# Patient Record
Sex: FEMALE | Race: WHITE | NOT HISPANIC OR LATINO | Employment: FULL TIME | URBAN - METROPOLITAN AREA
[De-identification: names, ages, dates, MRNs, and addresses within clinical notes are randomized per-mention and may not be internally consistent; named-entity substitution may affect disease eponyms.]

---

## 2023-03-17 RX ORDER — LEVOTHYROXINE SODIUM 25 UG/1
1 TABLET ORAL DAILY
COMMUNITY
Start: 2022-03-21 | End: 2023-05-04 | Stop reason: SDUPTHER

## 2023-04-27 ENCOUNTER — LAB (OUTPATIENT)
Dept: LAB | Facility: LAB | Age: 63
End: 2023-04-27
Payer: COMMERCIAL

## 2023-04-27 DIAGNOSIS — E78.5 HYPERLIPIDEMIA, UNSPECIFIED HYPERLIPIDEMIA TYPE: ICD-10-CM

## 2023-04-27 DIAGNOSIS — E03.9 HYPOTHYROIDISM, UNSPECIFIED TYPE: ICD-10-CM

## 2023-04-27 DIAGNOSIS — Z00.00 HEALTHCARE MAINTENANCE: ICD-10-CM

## 2023-04-27 LAB
ALANINE AMINOTRANSFERASE (SGPT) (U/L) IN SER/PLAS: 22 U/L (ref 7–45)
ALBUMIN (G/DL) IN SER/PLAS: 4.5 G/DL (ref 3.4–5)
ALKALINE PHOSPHATASE (U/L) IN SER/PLAS: 64 U/L (ref 33–136)
ANION GAP IN SER/PLAS: 15 MMOL/L (ref 10–20)
ASPARTATE AMINOTRANSFERASE (SGOT) (U/L) IN SER/PLAS: 25 U/L (ref 9–39)
BASOPHILS (10*3/UL) IN BLOOD BY AUTOMATED COUNT: 0.05 X10E9/L (ref 0–0.1)
BASOPHILS/100 LEUKOCYTES IN BLOOD BY AUTOMATED COUNT: 0.8 % (ref 0–2)
BILIRUBIN TOTAL (MG/DL) IN SER/PLAS: 0.7 MG/DL (ref 0–1.2)
CALCIUM (MG/DL) IN SER/PLAS: 9.5 MG/DL (ref 8.6–10.3)
CARBON DIOXIDE, TOTAL (MMOL/L) IN SER/PLAS: 27 MMOL/L (ref 21–32)
CHLORIDE (MMOL/L) IN SER/PLAS: 104 MMOL/L (ref 98–107)
CHOLESTEROL (MG/DL) IN SER/PLAS: 212 MG/DL (ref 0–199)
CHOLESTEROL IN HDL (MG/DL) IN SER/PLAS: 73.7 MG/DL
CHOLESTEROL/HDL RATIO: 2.9
CREATININE (MG/DL) IN SER/PLAS: 1.04 MG/DL (ref 0.5–1.05)
EOSINOPHILS (10*3/UL) IN BLOOD BY AUTOMATED COUNT: 0.14 X10E9/L (ref 0–0.7)
EOSINOPHILS/100 LEUKOCYTES IN BLOOD BY AUTOMATED COUNT: 2.2 % (ref 0–6)
ERYTHROCYTE DISTRIBUTION WIDTH (RATIO) BY AUTOMATED COUNT: 13 % (ref 11.5–14.5)
ERYTHROCYTE MEAN CORPUSCULAR HEMOGLOBIN CONCENTRATION (G/DL) BY AUTOMATED: 31.9 G/DL (ref 32–36)
ERYTHROCYTE MEAN CORPUSCULAR VOLUME (FL) BY AUTOMATED COUNT: 95 FL (ref 80–100)
ERYTHROCYTES (10*6/UL) IN BLOOD BY AUTOMATED COUNT: 4.45 X10E12/L (ref 4–5.2)
GFR FEMALE: 60 ML/MIN/1.73M2
GLUCOSE (MG/DL) IN SER/PLAS: 83 MG/DL (ref 74–99)
HEMATOCRIT (%) IN BLOOD BY AUTOMATED COUNT: 42.3 % (ref 36–46)
HEMOGLOBIN (G/DL) IN BLOOD: 13.5 G/DL (ref 12–16)
IMMATURE GRANULOCYTES/100 LEUKOCYTES IN BLOOD BY AUTOMATED COUNT: 0.2 % (ref 0–0.9)
LDL: 122 MG/DL (ref 0–99)
LEUKOCYTES (10*3/UL) IN BLOOD BY AUTOMATED COUNT: 6.4 X10E9/L (ref 4.4–11.3)
LYMPHOCYTES (10*3/UL) IN BLOOD BY AUTOMATED COUNT: 2.41 X10E9/L (ref 1.2–4.8)
LYMPHOCYTES/100 LEUKOCYTES IN BLOOD BY AUTOMATED COUNT: 37.8 % (ref 13–44)
MONOCYTES (10*3/UL) IN BLOOD BY AUTOMATED COUNT: 0.6 X10E9/L (ref 0.1–1)
MONOCYTES/100 LEUKOCYTES IN BLOOD BY AUTOMATED COUNT: 9.4 % (ref 2–10)
NEUTROPHILS (10*3/UL) IN BLOOD BY AUTOMATED COUNT: 3.17 X10E9/L (ref 1.2–7.7)
NEUTROPHILS/100 LEUKOCYTES IN BLOOD BY AUTOMATED COUNT: 49.6 % (ref 40–80)
PLATELETS (10*3/UL) IN BLOOD AUTOMATED COUNT: 249 X10E9/L (ref 150–450)
POTASSIUM (MMOL/L) IN SER/PLAS: 4.8 MMOL/L (ref 3.5–5.3)
PROTEIN TOTAL: 7 G/DL (ref 6.4–8.2)
SODIUM (MMOL/L) IN SER/PLAS: 141 MMOL/L (ref 136–145)
THYROTROPIN (MIU/L) IN SER/PLAS BY DETECTION LIMIT <= 0.05 MIU/L: 3.59 MIU/L (ref 0.44–3.98)
TRIGLYCERIDE (MG/DL) IN SER/PLAS: 81 MG/DL (ref 0–149)
UREA NITROGEN (MG/DL) IN SER/PLAS: 24 MG/DL (ref 6–23)
VLDL: 16 MG/DL (ref 0–40)

## 2023-04-27 PROCEDURE — 80053 COMPREHEN METABOLIC PANEL: CPT

## 2023-04-27 PROCEDURE — 80061 LIPID PANEL: CPT

## 2023-04-27 PROCEDURE — 36415 COLL VENOUS BLD VENIPUNCTURE: CPT

## 2023-04-27 PROCEDURE — 82306 VITAMIN D 25 HYDROXY: CPT

## 2023-04-27 PROCEDURE — 84443 ASSAY THYROID STIM HORMONE: CPT

## 2023-04-27 PROCEDURE — 85025 COMPLETE CBC W/AUTO DIFF WBC: CPT

## 2023-04-28 LAB — CALCIDIOL (25 OH VITAMIN D3) (NG/ML) IN SER/PLAS: 45 NG/ML

## 2023-05-01 ASSESSMENT — PROMIS GLOBAL HEALTH SCALE
EMOTIONAL_PROBLEMS: RARELY
RATE_MENTAL_HEALTH: GOOD
RATE_AVERAGE_PAIN: 2
RATE_PHYSICAL_HEALTH: GOOD
RATE_AVERAGE_FATIGUE: MILD
RATE_GENERAL_HEALTH: GOOD
CARRYOUT_PHYSICAL_ACTIVITIES: COMPLETELY
RATE_SOCIAL_SATISFACTION: GOOD
CARRYOUT_SOCIAL_ACTIVITIES: GOOD
RATE_QUALITY_OF_LIFE: GOOD

## 2023-05-04 ENCOUNTER — OFFICE VISIT (OUTPATIENT)
Dept: PRIMARY CARE | Facility: CLINIC | Age: 63
End: 2023-05-04
Payer: COMMERCIAL

## 2023-05-04 VITALS
HEART RATE: 72 BPM | SYSTOLIC BLOOD PRESSURE: 107 MMHG | DIASTOLIC BLOOD PRESSURE: 73 MMHG | BODY MASS INDEX: 23 KG/M2 | HEIGHT: 63 IN | WEIGHT: 129.8 LBS | OXYGEN SATURATION: 98 %

## 2023-05-04 DIAGNOSIS — E03.9 HYPOTHYROIDISM, UNSPECIFIED TYPE: ICD-10-CM

## 2023-05-04 DIAGNOSIS — Z12.31 ENCOUNTER FOR SCREENING MAMMOGRAM FOR MALIGNANT NEOPLASM OF BREAST: Primary | ICD-10-CM

## 2023-05-04 DIAGNOSIS — N89.8 VAGINAL DRYNESS: ICD-10-CM

## 2023-05-04 PROCEDURE — 1036F TOBACCO NON-USER: CPT | Performed by: NURSE PRACTITIONER

## 2023-05-04 PROCEDURE — 99396 PREV VISIT EST AGE 40-64: CPT | Performed by: NURSE PRACTITIONER

## 2023-05-04 RX ORDER — LEVOTHYROXINE SODIUM 25 UG/1
25 TABLET ORAL DAILY
Qty: 90 TABLET | Refills: 1 | Status: SHIPPED | OUTPATIENT
Start: 2023-05-04 | End: 2023-12-22

## 2023-05-04 RX ORDER — ESTRADIOL 0.1 MG/G
2 CREAM VAGINAL DAILY
Qty: 42.5 G | Refills: 5 | Status: SHIPPED | OUTPATIENT
Start: 2023-05-04 | End: 2024-05-03

## 2023-05-04 ASSESSMENT — ENCOUNTER SYMPTOMS
DIARRHEA: 0
FATIGUE: 0
NAUSEA: 0
PALPITATIONS: 0
SORE THROAT: 0
VOMITING: 0
MUSCULOSKELETAL NEGATIVE: 1
FEVER: 0
NUMBNESS: 0
PSYCHIATRIC NEGATIVE: 1
ABDOMINAL PAIN: 0
WEAKNESS: 0
SHORTNESS OF BREATH: 0
CONSTIPATION: 0
DIZZINESS: 0
CHILLS: 0
COUGH: 0
HEADACHES: 0

## 2023-05-04 ASSESSMENT — PATIENT HEALTH QUESTIONNAIRE - PHQ9
SUM OF ALL RESPONSES TO PHQ9 QUESTIONS 1 AND 2: 0
1. LITTLE INTEREST OR PLEASURE IN DOING THINGS: NOT AT ALL
2. FEELING DOWN, DEPRESSED OR HOPELESS: NOT AT ALL

## 2023-05-04 NOTE — PROGRESS NOTES
"Subjective   Patient ID: Olga Lane is a 62 y.o. female who presents for annual follow-up and review of lab results      HPI   Patient is feeling well  Reviewed recent lab results, questions about abnormal cholesterol.  Discussed dietary changes.  Remains physically active  Denies chest pain, SOB, palpitations, dizziness,  or GI issues.  Reports vaginal dryness with intercourse.  Would like a renewal of the estrogen cream.  Vital signs stable    Review of Systems   Constitutional:  Negative for chills, fatigue and fever.   HENT:  Negative for congestion, ear pain and sore throat.    Eyes:  Negative for visual disturbance.   Respiratory:  Negative for cough and shortness of breath.    Cardiovascular:  Negative for chest pain, palpitations and leg swelling.   Gastrointestinal:  Negative for abdominal pain, constipation, diarrhea, nausea and vomiting.   Genitourinary: Negative.    Musculoskeletal: Negative.    Skin:  Negative for rash.   Neurological:  Negative for dizziness, weakness, numbness and headaches.   Psychiatric/Behavioral: Negative.         Objective   /73   Pulse 72   Ht 1.588 m (5' 2.5\")   Wt 58.9 kg (129 lb 12.8 oz)   SpO2 98%   BMI 23.36 kg/m²     Physical Exam  Constitutional:       General: She is not in acute distress.     Appearance: Normal appearance.   HENT:      Head: Normocephalic and atraumatic.      Right Ear: Tympanic membrane, ear canal and external ear normal.      Left Ear: Tympanic membrane, ear canal and external ear normal.      Nose: Nose normal.      Mouth/Throat:      Mouth: Mucous membranes are moist.      Pharynx: Oropharynx is clear.   Eyes:      Extraocular Movements: Extraocular movements intact.      Conjunctiva/sclera: Conjunctivae normal.      Pupils: Pupils are equal, round, and reactive to light.   Cardiovascular:      Rate and Rhythm: Normal rate and regular rhythm.      Pulses: Normal pulses.      Heart sounds: Normal heart sounds. No murmur heard.  Pulmonary: "      Effort: Pulmonary effort is normal.      Breath sounds: Normal breath sounds. No wheezing, rhonchi or rales.   Abdominal:      General: Bowel sounds are normal.      Palpations: Abdomen is soft.      Tenderness: There is no abdominal tenderness.   Musculoskeletal:         General: Normal range of motion.      Cervical back: Normal range of motion and neck supple.   Lymphadenopathy:      Comments: No lymphadenopathy noted   Skin:     General: Skin is warm and dry.      Findings: No rash.   Neurological:      General: No focal deficit present.      Mental Status: She is alert and oriented to person, place, and time.      Cranial Nerves: No cranial nerve deficit.      Coordination: Coordination normal.      Gait: Gait normal.   Psychiatric:         Mood and Affect: Mood normal.         Behavior: Behavior normal.         Assessment/Plan   Problem List Items Addressed This Visit          Genitourinary    Vaginal dryness     Start estrogen cream as directed         Relevant Medications    estradiol (Estrace) 0.01 % (0.1 mg/gram) vaginal cream       Endocrine/Metabolic    Hypothyroidism     Continue levothyroxine at 25 mcg daily  We will recheck TSH at next visit         Relevant Medications    levothyroxine (Synthroid, Levoxyl) 25 mcg tablet       Other    Encounter for screening mammogram for malignant neoplasm of breast - Primary    Relevant Orders    BI mammo bilateral screening tomosynthesis     Colonoscopy done 5 years ago, due again 2028.

## 2023-05-04 NOTE — PATIENT INSTRUCTIONS
Start calcium supplement with vitamin D capsule daily  Consider seeing gynecology to discuss further Pap screening  Schedule mammogram  Continue levothyroxine at prescribed dose  Use estrogen cream as directed

## 2023-05-22 ENCOUNTER — TELEPHONE (OUTPATIENT)
Dept: PRIMARY CARE | Facility: CLINIC | Age: 63
End: 2023-05-22
Payer: COMMERCIAL

## 2023-05-22 NOTE — TELEPHONE ENCOUNTER
Result Communication    No results found from the In Basket message.    11:21 AM      Results {WERE / WERE NOT:68483} successfully communicated with the {RHEUM PARENT/PATIENT:[id number]} and they {AMB Acknowledged/Did Not Acknowledge:84001} their understanding.

## 2023-05-22 NOTE — TELEPHONE ENCOUNTER
----- Message from Jerald Marcial MA sent at 5/22/2023 11:10 AM EDT -----  Would like you to call her and let her know how to use this vaginal cream you prescribed her, she is confused because of the syringe the came with it.  And I am not sure what she is talking about because I have never used anything like that

## 2023-12-22 DIAGNOSIS — E03.9 HYPOTHYROIDISM, UNSPECIFIED TYPE: ICD-10-CM

## 2023-12-22 RX ORDER — LEVOTHYROXINE SODIUM 25 UG/1
25 TABLET ORAL DAILY
Qty: 90 TABLET | Refills: 0 | Status: SHIPPED | OUTPATIENT
Start: 2023-12-22 | End: 2024-03-26

## 2024-03-25 DIAGNOSIS — E03.9 HYPOTHYROIDISM, UNSPECIFIED TYPE: ICD-10-CM

## 2024-03-26 RX ORDER — LEVOTHYROXINE SODIUM 25 UG/1
25 TABLET ORAL DAILY
Qty: 90 TABLET | Refills: 0 | Status: SHIPPED | OUTPATIENT
Start: 2024-03-26

## 2024-06-15 DIAGNOSIS — E03.9 HYPOTHYROIDISM, UNSPECIFIED TYPE: ICD-10-CM

## 2024-06-17 RX ORDER — LEVOTHYROXINE SODIUM 25 UG/1
25 TABLET ORAL DAILY
Qty: 30 TABLET | Refills: 0 | Status: SHIPPED | OUTPATIENT
Start: 2024-06-17

## 2024-06-18 DIAGNOSIS — Z00.00 HEALTHCARE MAINTENANCE: ICD-10-CM

## 2024-06-18 DIAGNOSIS — E03.9 HYPOTHYROIDISM, UNSPECIFIED TYPE: ICD-10-CM

## 2024-06-18 NOTE — PROGRESS NOTES
Orders Placed This Encounter   Procedures    CBC     Standing Status:   Future     Standing Expiration Date:   6/18/2025     Order Specific Question:   Release result to MyChart     Answer:   Immediate    Comprehensive Metabolic Panel     Standing Status:   Future     Standing Expiration Date:   6/18/2025     Order Specific Question:   Release result to MyChart     Answer:   Immediate    Lipid Panel     Standing Status:   Future     Standing Expiration Date:   6/18/2025     Order Specific Question:   Release result to MyChart     Answer:   Immediate    Thyroid Stimulating Hormone     Standing Status:   Future     Standing Expiration Date:   6/18/2025     Order Specific Question:   Release result to MyChart     Answer:   Immediate

## 2024-06-27 ENCOUNTER — LAB (OUTPATIENT)
Dept: LAB | Facility: LAB | Age: 64
End: 2024-06-27
Payer: COMMERCIAL

## 2024-06-27 DIAGNOSIS — E03.9 HYPOTHYROIDISM, UNSPECIFIED TYPE: ICD-10-CM

## 2024-06-27 DIAGNOSIS — Z00.00 HEALTHCARE MAINTENANCE: ICD-10-CM

## 2024-06-27 LAB
ALBUMIN SERPL BCP-MCNC: 4.3 G/DL (ref 3.4–5)
ALP SERPL-CCNC: 60 U/L (ref 33–136)
ALT SERPL W P-5'-P-CCNC: 16 U/L (ref 7–45)
ANION GAP SERPL CALC-SCNC: 11 MMOL/L (ref 10–20)
AST SERPL W P-5'-P-CCNC: 21 U/L (ref 9–39)
BILIRUB SERPL-MCNC: 0.8 MG/DL (ref 0–1.2)
BUN SERPL-MCNC: 27 MG/DL (ref 6–23)
CALCIUM SERPL-MCNC: 9.5 MG/DL (ref 8.6–10.3)
CHLORIDE SERPL-SCNC: 104 MMOL/L (ref 98–107)
CHOLEST SERPL-MCNC: 218 MG/DL (ref 0–199)
CHOLESTEROL/HDL RATIO: 2.8
CO2 SERPL-SCNC: 28 MMOL/L (ref 21–32)
CREAT SERPL-MCNC: 1.19 MG/DL (ref 0.5–1.05)
EGFRCR SERPLBLD CKD-EPI 2021: 51 ML/MIN/1.73M*2
ERYTHROCYTE [DISTWIDTH] IN BLOOD BY AUTOMATED COUNT: 12.5 % (ref 11.5–14.5)
GLUCOSE SERPL-MCNC: 81 MG/DL (ref 74–99)
HCT VFR BLD AUTO: 41.8 % (ref 36–46)
HDLC SERPL-MCNC: 77 MG/DL
HGB BLD-MCNC: 13.6 G/DL (ref 12–16)
LDLC SERPL CALC-MCNC: 126 MG/DL
MCH RBC QN AUTO: 30.6 PG (ref 26–34)
MCHC RBC AUTO-ENTMCNC: 32.5 G/DL (ref 32–36)
MCV RBC AUTO: 94 FL (ref 80–100)
NON HDL CHOLESTEROL: 141 MG/DL (ref 0–149)
NRBC BLD-RTO: 0 /100 WBCS (ref 0–0)
PLATELET # BLD AUTO: 236 X10*3/UL (ref 150–450)
POTASSIUM SERPL-SCNC: 4.4 MMOL/L (ref 3.5–5.3)
PROT SERPL-MCNC: 6.9 G/DL (ref 6.4–8.2)
RBC # BLD AUTO: 4.44 X10*6/UL (ref 4–5.2)
SODIUM SERPL-SCNC: 139 MMOL/L (ref 136–145)
TRIGL SERPL-MCNC: 73 MG/DL (ref 0–149)
TSH SERPL-ACNC: 3.11 MIU/L (ref 0.44–3.98)
VLDL: 15 MG/DL (ref 0–40)
WBC # BLD AUTO: 6.3 X10*3/UL (ref 4.4–11.3)

## 2024-06-27 PROCEDURE — 85027 COMPLETE CBC AUTOMATED: CPT

## 2024-06-27 PROCEDURE — 36415 COLL VENOUS BLD VENIPUNCTURE: CPT

## 2024-06-27 PROCEDURE — 82306 VITAMIN D 25 HYDROXY: CPT

## 2024-06-27 PROCEDURE — 80053 COMPREHEN METABOLIC PANEL: CPT

## 2024-06-27 PROCEDURE — 84443 ASSAY THYROID STIM HORMONE: CPT

## 2024-06-27 PROCEDURE — 80061 LIPID PANEL: CPT

## 2024-07-01 ENCOUNTER — APPOINTMENT (OUTPATIENT)
Dept: PRIMARY CARE | Facility: CLINIC | Age: 64
End: 2024-07-01
Payer: COMMERCIAL

## 2024-07-01 VITALS
WEIGHT: 135 LBS | SYSTOLIC BLOOD PRESSURE: 130 MMHG | DIASTOLIC BLOOD PRESSURE: 84 MMHG | BODY MASS INDEX: 23.92 KG/M2 | HEIGHT: 63 IN | OXYGEN SATURATION: 99 % | HEART RATE: 56 BPM

## 2024-07-01 DIAGNOSIS — Z12.31 ENCOUNTER FOR SCREENING MAMMOGRAM FOR MALIGNANT NEOPLASM OF BREAST: ICD-10-CM

## 2024-07-01 DIAGNOSIS — R79.89 LOW VITAMIN D LEVEL: ICD-10-CM

## 2024-07-01 DIAGNOSIS — R79.89 ELEVATED SERUM CREATININE: ICD-10-CM

## 2024-07-01 DIAGNOSIS — R10.9 ABDOMINAL CRAMPING: ICD-10-CM

## 2024-07-01 DIAGNOSIS — Z00.00 ENCOUNTER FOR PREVENTIVE HEALTH EXAMINATION: Primary | ICD-10-CM

## 2024-07-01 LAB — 25(OH)D3 SERPL-MCNC: 32 NG/ML (ref 30–100)

## 2024-07-01 PROCEDURE — 99396 PREV VISIT EST AGE 40-64: CPT | Performed by: INTERNAL MEDICINE

## 2024-07-01 RX ORDER — DICYCLOMINE HYDROCHLORIDE 20 MG/1
20 TABLET ORAL 3 TIMES DAILY PRN
Qty: 60 TABLET | Refills: 0 | Status: SHIPPED | OUTPATIENT
Start: 2024-07-01 | End: 2024-08-30

## 2024-07-01 ASSESSMENT — ENCOUNTER SYMPTOMS
ABDOMINAL PAIN: 1
CONSTIPATION: 0
CHEST TIGHTNESS: 0
ARTHRALGIAS: 1
NAUSEA: 0
VOMITING: 0
FREQUENCY: 0
SHORTNESS OF BREATH: 0
DIFFICULTY URINATING: 0
BLOOD IN STOOL: 0
UNEXPECTED WEIGHT CHANGE: 0

## 2024-07-01 NOTE — PROGRESS NOTES
"Subjective   Patient ID: Olga Lane is a 63 y.o. female who presents for Annual Exam.    63 y.o. female with history of hypothyroidism and tinnitus presenting for an annual exam. Pt states she has been having chronic abdominal cramping and bloating for the past few years. She states that normally, her stools are more firm but recently in the past few months she notices her stool is loose. Pt is currently taking women's multivitamins 1x in the morning and calcium supplements. She indicates that she believes her stools were slightly firmer when she stopped taking the multivitamins approximately 1-2 weeks prior. She denies bleeding in stool, nausea, vomiting, heartburn, shortness of breath, weight loss, and disturbances to her sleep. She also reports no new changes regarding her tinnitus.          Review of Systems   Constitutional:  Negative for unexpected weight change.   HENT:  Positive for tinnitus. Negative for ear pain.         Stable   Eyes:  Negative for visual disturbance.   Respiratory:  Negative for chest tightness and shortness of breath.    Cardiovascular:  Negative for chest pain.   Gastrointestinal:  Positive for abdominal pain. Negative for blood in stool, constipation, nausea and vomiting.   Genitourinary:  Negative for difficulty urinating and frequency.   Musculoskeletal:  Positive for arthralgias.   All other systems reviewed and are negative.      Objective   /84   Pulse 56   Ht 1.588 m (5' 2.5\")   Wt 61.2 kg (135 lb)   SpO2 99%   BMI 24.30 kg/m²     Physical Exam  Vitals reviewed.   Constitutional:       General: She is not in acute distress.     Appearance: Normal appearance. She is normal weight. She is not ill-appearing.   HENT:      Head: Normocephalic and atraumatic.      Right Ear: Tympanic membrane, ear canal and external ear normal.      Left Ear: Tympanic membrane, ear canal and external ear normal.      Mouth/Throat:      Mouth: Mucous membranes are moist.      Pharynx: " Oropharynx is clear. No oropharyngeal exudate or posterior oropharyngeal erythema.   Eyes:      Extraocular Movements: Extraocular movements intact.      Conjunctiva/sclera: Conjunctivae normal.      Pupils: Pupils are equal, round, and reactive to light.   Cardiovascular:      Rate and Rhythm: Normal rate and regular rhythm.      Pulses: Normal pulses.      Heart sounds: Normal heart sounds. No murmur heard.     No friction rub. No gallop.   Pulmonary:      Effort: Pulmonary effort is normal. No respiratory distress.      Breath sounds: Normal breath sounds. No stridor. No wheezing, rhonchi or rales.   Chest:      Chest wall: No tenderness.   Abdominal:      General: There is no distension.      Palpations: There is no mass.      Tenderness: There is no abdominal tenderness. There is no guarding.   Musculoskeletal:         General: No tenderness.      Comments: + Trigger finger in 3rd digit bilaterally.   Skin:     General: Skin is warm and dry.   Neurological:      General: No focal deficit present.      Mental Status: She is alert and oriented to person, place, and time. Mental status is at baseline.   Psychiatric:         Mood and Affect: Mood normal.         Behavior: Behavior normal.         Assessment/Plan   #Abdominal cramping, bloating   - IBS vs medication induced vs other   - Recommended to halt multivitamin and calcium supplements to see if there are changes to stool quality.  - Add metamucil or Benefiber, Trial of Bentyl prn   - If does not improve, will refer to GI for further evaluation  - Last colonoscopy was 2017 with no significant findings.    #HLD  - Cholesterol 218, , and HDL 2.8  - ASCVD score 4.1%  - Recommended to increase weekly physical activity to minimum 150 min a week, low fat diet  -Repeat lipids in 12 months     #Elevated creatinine  -Labs on 6/27 show mildly elevated BUN 27, creatinine 1.19, and eGFR 51.   -Encouraged hydration and avoid NSAIDs  -Repeat BMP in 8-12 weeks      #Vitamin D Deficiency  -Vit D wnl. Cont Vit supplementation     #Hypothyroidism  - Stable with current levothyroxine 25 mcg dose.  - TSH 6/27: 3.11 wnl    #Bilateral hand pain  - Trigger finger vs OA, likely trigger finger of 3rd digit bilaterally.  -No bothersome at this time. If sx worsen, will refer to ortho.       Influenza: UTD  COVID: x4  Prevnar 13: Never  Pneumovax 23: 9/20/22  Prevnar 20: due 9/2027  RSV: 9/20/23  Shingrix: Recommended  Mamm: Ordered  DEXA: never   Pap: per gyn   Colorectal ca: 2017-10 years   Lung ca screening: NI     RTC 6 mo    I saw and evaluated the patient. I personally obtained the key and critical portions of the history and physical exam or was physically present for key and critical portions performed by the resident/fellow. I reviewed the resident/fellow's documentation and discussed the patient with the resident/fellow. I agree with the resident/fellow's medical decision making as documented in the note.    Jair Carr, DO

## 2024-07-01 NOTE — PATIENT INSTRUCTIONS
Increase fiber, take Bentyl as needed- if belly issues persist please MyChart me and I will have you see GI   Increase water and repeat BMP in 8 weeks- dont fast   Mamm [id number]  Minimize red meat, fish and poultry are good, increase fiber (Metamucil or Benefiber), lots or fruits and veges and try to 150 min of cardiovascular exercise per week.    6 months

## 2024-07-18 ENCOUNTER — HOSPITAL ENCOUNTER (OUTPATIENT)
Dept: RADIOLOGY | Facility: HOSPITAL | Age: 64
Discharge: HOME | End: 2024-07-18
Payer: COMMERCIAL

## 2024-07-18 ENCOUNTER — LAB (OUTPATIENT)
Dept: LAB | Facility: LAB | Age: 64
End: 2024-07-18
Payer: COMMERCIAL

## 2024-07-18 VITALS — WEIGHT: 135 LBS | BODY MASS INDEX: 23.92 KG/M2 | HEIGHT: 63 IN

## 2024-07-18 DIAGNOSIS — R79.89 ELEVATED SERUM CREATININE: ICD-10-CM

## 2024-07-18 DIAGNOSIS — Z12.31 ENCOUNTER FOR SCREENING MAMMOGRAM FOR MALIGNANT NEOPLASM OF BREAST: ICD-10-CM

## 2024-07-18 LAB
ANION GAP SERPL CALC-SCNC: 14 MMOL/L (ref 10–20)
BUN SERPL-MCNC: 23 MG/DL (ref 6–23)
CALCIUM SERPL-MCNC: 9.7 MG/DL (ref 8.6–10.3)
CHLORIDE SERPL-SCNC: 105 MMOL/L (ref 98–107)
CO2 SERPL-SCNC: 27 MMOL/L (ref 21–32)
CREAT SERPL-MCNC: 1.11 MG/DL (ref 0.5–1.05)
EGFRCR SERPLBLD CKD-EPI 2021: 56 ML/MIN/1.73M*2
GLUCOSE SERPL-MCNC: 75 MG/DL (ref 74–99)
POTASSIUM SERPL-SCNC: 4.7 MMOL/L (ref 3.5–5.3)
SODIUM SERPL-SCNC: 141 MMOL/L (ref 136–145)

## 2024-07-18 PROCEDURE — 80048 BASIC METABOLIC PNL TOTAL CA: CPT

## 2024-07-18 PROCEDURE — 36415 COLL VENOUS BLD VENIPUNCTURE: CPT

## 2024-07-18 PROCEDURE — 77067 SCR MAMMO BI INCL CAD: CPT | Performed by: RADIOLOGY

## 2024-07-18 PROCEDURE — 77067 SCR MAMMO BI INCL CAD: CPT

## 2024-07-18 PROCEDURE — 77063 BREAST TOMOSYNTHESIS BI: CPT | Performed by: RADIOLOGY

## 2024-07-22 DIAGNOSIS — N18.31 STAGE 3A CHRONIC KIDNEY DISEASE (MULTI): Primary | ICD-10-CM

## 2024-07-22 RX ORDER — LOSARTAN POTASSIUM 25 MG/1
25 TABLET ORAL DAILY
Qty: 90 TABLET | Refills: 1 | Status: SHIPPED | OUTPATIENT
Start: 2024-07-22 | End: 2025-01-18

## 2024-07-29 ENCOUNTER — APPOINTMENT (OUTPATIENT)
Dept: RADIOLOGY | Facility: HOSPITAL | Age: 64
End: 2024-07-29
Payer: COMMERCIAL

## 2024-07-29 DIAGNOSIS — N18.31 STAGE 3A CHRONIC KIDNEY DISEASE (MULTI): ICD-10-CM

## 2024-07-29 PROCEDURE — 76770 US EXAM ABDO BACK WALL COMP: CPT | Performed by: RADIOLOGY

## 2024-07-29 PROCEDURE — 76770 US EXAM ABDO BACK WALL COMP: CPT

## 2024-08-01 DIAGNOSIS — N28.1 RENAL CYST, LEFT: Primary | ICD-10-CM

## 2024-11-04 ENCOUNTER — APPOINTMENT (OUTPATIENT)
Dept: PRIMARY CARE | Facility: CLINIC | Age: 64
End: 2024-11-04
Payer: COMMERCIAL

## 2024-11-04 VITALS
HEART RATE: 61 BPM | BODY MASS INDEX: 23.88 KG/M2 | TEMPERATURE: 97.8 F | HEIGHT: 63 IN | SYSTOLIC BLOOD PRESSURE: 124 MMHG | OXYGEN SATURATION: 98 % | DIASTOLIC BLOOD PRESSURE: 80 MMHG | WEIGHT: 134.8 LBS | RESPIRATION RATE: 16 BRPM

## 2024-11-04 DIAGNOSIS — G89.29 CHRONIC RIGHT SHOULDER PAIN: ICD-10-CM

## 2024-11-04 DIAGNOSIS — M25.511 CHRONIC RIGHT SHOULDER PAIN: ICD-10-CM

## 2024-11-04 DIAGNOSIS — R20.0 FINGER NUMBNESS: ICD-10-CM

## 2024-11-04 DIAGNOSIS — M25.551 RIGHT HIP PAIN: ICD-10-CM

## 2024-11-04 DIAGNOSIS — E03.9 HYPOTHYROIDISM, UNSPECIFIED TYPE: Primary | ICD-10-CM

## 2024-11-04 DIAGNOSIS — N18.31 CKD STAGE 3A, GFR 45-59 ML/MIN (MULTI): ICD-10-CM

## 2024-11-04 PROCEDURE — 3008F BODY MASS INDEX DOCD: CPT | Performed by: FAMILY MEDICINE

## 2024-11-04 PROCEDURE — 1036F TOBACCO NON-USER: CPT | Performed by: FAMILY MEDICINE

## 2024-11-04 PROCEDURE — 99214 OFFICE O/P EST MOD 30 MIN: CPT | Performed by: FAMILY MEDICINE

## 2024-11-04 ASSESSMENT — ANXIETY QUESTIONNAIRES
2. NOT BEING ABLE TO STOP OR CONTROL WORRYING: SEVERAL DAYS
GAD7 TOTAL SCORE: 7
6. BECOMING EASILY ANNOYED OR IRRITABLE: SEVERAL DAYS
5. BEING SO RESTLESS THAT IT IS HARD TO SIT STILL: SEVERAL DAYS
3. WORRYING TOO MUCH ABOUT DIFFERENT THINGS: SEVERAL DAYS
4. TROUBLE RELAXING: SEVERAL DAYS
7. FEELING AFRAID AS IF SOMETHING AWFUL MIGHT HAPPEN: SEVERAL DAYS
IF YOU CHECKED OFF ANY PROBLEMS ON THIS QUESTIONNAIRE, HOW DIFFICULT HAVE THESE PROBLEMS MADE IT FOR YOU TO DO YOUR WORK, TAKE CARE OF THINGS AT HOME, OR GET ALONG WITH OTHER PEOPLE: SOMEWHAT DIFFICULT
1. FEELING NERVOUS, ANXIOUS, OR ON EDGE: SEVERAL DAYS

## 2024-11-04 ASSESSMENT — PATIENT HEALTH QUESTIONNAIRE - PHQ9
3. TROUBLE FALLING OR STAYING ASLEEP OR SLEEPING TOO MUCH: SEVERAL DAYS
SUM OF ALL RESPONSES TO PHQ QUESTIONS 1-9: 4
SUM OF ALL RESPONSES TO PHQ9 QUESTIONS 1 AND 2: 0
1. LITTLE INTEREST OR PLEASURE IN DOING THINGS: NOT AT ALL
5. POOR APPETITE OR OVEREATING: SEVERAL DAYS
2. FEELING DOWN, DEPRESSED OR HOPELESS: NOT AT ALL
4. FEELING TIRED OR HAVING LITTLE ENERGY: SEVERAL DAYS
10. IF YOU CHECKED OFF ANY PROBLEMS, HOW DIFFICULT HAVE THESE PROBLEMS MADE IT FOR YOU TO DO YOUR WORK, TAKE CARE OF THINGS AT HOME, OR GET ALONG WITH OTHER PEOPLE: NOT DIFFICULT AT ALL
6. FEELING BAD ABOUT YOURSELF - OR THAT YOU ARE A FAILURE OR HAVE LET YOURSELF OR YOUR FAMILY DOWN: NOT AT ALL
8. MOVING OR SPEAKING SO SLOWLY THAT OTHER PEOPLE COULD HAVE NOTICED. OR THE OPPOSITE, BEING SO FIGETY OR RESTLESS THAT YOU HAVE BEEN MOVING AROUND A LOT MORE THAN USUAL: NOT AT ALL
9. THOUGHTS THAT YOU WOULD BE BETTER OFF DEAD, OR OF HURTING YOURSELF: NOT AT ALL
7. TROUBLE CONCENTRATING ON THINGS, SUCH AS READING THE NEWSPAPER OR WATCHING TELEVISION: SEVERAL DAYS

## 2024-11-04 NOTE — PROGRESS NOTES
"Subjective   Olga Lane is a 64 y.o. female who presents for New Patient Visit (Aches and pains throughout whole body), Numbness (left hand numbness /), Arm Pain (Lifting right arm too high causes pain), and thigh pain (Sharp pains random).  HPI    L middle finger numbness. Has locked for years. No OTC medication tried.     L foot pain in arch    R arm pain w certain movement. Few mo. No injury.     Bowels loose for last 6 mo. Uses metamucil daily. No abd pain or blood in stool.   Current Outpatient Medications on File Prior to Visit   Medication Sig Dispense Refill    levothyroxine (Synthroid, Levoxyl) 25 mcg tablet TAKE ONE TABLET BY MOUTH DAILY 90 tablet 3    losartan (Cozaar) 25 mg tablet Take 1 tablet (25 mg) by mouth once daily. 90 tablet 1    multivitamin with minerals (multivitamin-iron-folic acid) tablet Take by mouth.      psyllium husk (METAMUCIL ORAL) Take by mouth early in the morning..      estradiol (Estrace) 0.01 % (0.1 mg/gram) vaginal cream Insert 20 Applications into the vagina once daily. Apply to vagina nightly for 1 week then every Monday/Wednesday/Friday. 42.5 g 5     No current facility-administered medications on file prior to visit.        Patient Health Questionnaire-9 Score: 4           Objective   /80   Pulse 61   Temp 36.6 °C (97.8 °F)   Resp 16   Ht 1.588 m (5' 2.5\")   Wt 61.1 kg (134 lb 12.8 oz)   SpO2 98%   BMI 24.26 kg/m²    Physical Exam  General: NAD  HEENT:NCAT, PERRLA, nml OP,b/l TMclear   Neck: no cervical BHARATHI  Heart: RRR no murmur, no edema   Lungs: CTA b/l, no wheeze or rhonchi   GI: abd soft, nontender, nondistended.   MSK: no c/c/e. nml gait    R shoulder pain w ROM mild +R drop can   L mid foot/arch pain   Nml R hip ROM/log roll/abdoulaye   Nml L finger ROM, mild dec sens L distal middle finger  Skin: warm and dry  Psych: cooperative, appropriate affect  Neuro: speech clear. A&Ox3  Assessment/Plan   Problem List Items Addressed This Visit       Hypothyroidism - " Primary  TSH nml   Cont LT4      Other Visit Diagnoses       CKD stage 3a, GFR 45-59 ml/min (Multi)      Rpt lab in dec w urine tests  Cont losartan     Relevant Orders    Basic Metabolic Panel    Urinalysis with Reflex Culture and Microscopic    Albumin-Creatinine Ratio, Urine Random    Chronic right shoulder pain      Losing ROM/strength   Likely rotator cuff issue  RF PT  Xray     Relevant Orders    Referral to Physical Therapy    XR shoulder right 2+ views    Right hip pain      Intermittent   If more persistent xrays/PT  Voltaren gel ok  Avoid other NSAIDS CKD, tylenol OK       Finger numbness      Likely  superficial paresthesia    URI: improving     Constipation: fiber now causing loose stools. Dec fiber  to every other day

## 2024-12-03 ENCOUNTER — APPOINTMENT (OUTPATIENT)
Dept: NEPHROLOGY | Facility: CLINIC | Age: 64
End: 2024-12-03
Payer: COMMERCIAL

## 2024-12-03 VITALS
OXYGEN SATURATION: 98 % | DIASTOLIC BLOOD PRESSURE: 82 MMHG | BODY MASS INDEX: 25.06 KG/M2 | SYSTOLIC BLOOD PRESSURE: 116 MMHG | TEMPERATURE: 97.1 F | WEIGHT: 136.2 LBS | HEART RATE: 70 BPM | HEIGHT: 62 IN

## 2024-12-03 DIAGNOSIS — R79.89 ELEVATED SERUM CREATININE: Primary | ICD-10-CM

## 2024-12-03 DIAGNOSIS — N18.30 STAGE 3 CHRONIC KIDNEY DISEASE, UNSPECIFIED WHETHER STAGE 3A OR 3B CKD (MULTI): ICD-10-CM

## 2024-12-03 LAB
POC APPEARANCE, URINE: ABNORMAL
POC BILIRUBIN, URINE: NEGATIVE
POC BLOOD, URINE: ABNORMAL
POC COLOR, URINE: YELLOW
POC GLUCOSE, URINE: NEGATIVE MG/DL
POC KETONES, URINE: NEGATIVE MG/DL
POC LEUKOCYTES, URINE: NEGATIVE
POC NITRITE,URINE: NEGATIVE
POC PH, URINE: 5 PH
POC PROTEIN, URINE: NEGATIVE MG/DL
POC SPECIFIC GRAVITY, URINE: <=1.005
POC UROBILINOGEN, URINE: 0.2 EU/DL

## 2024-12-03 PROCEDURE — 81003 URINALYSIS AUTO W/O SCOPE: CPT | Performed by: INTERNAL MEDICINE

## 2024-12-03 PROCEDURE — 99204 OFFICE O/P NEW MOD 45 MIN: CPT | Performed by: INTERNAL MEDICINE

## 2024-12-03 PROCEDURE — 3008F BODY MASS INDEX DOCD: CPT | Performed by: INTERNAL MEDICINE

## 2024-12-03 NOTE — PROGRESS NOTES
Subjective       Olga Lane is a 64 y.o. female who has past medical history of hypothyroidism on supplements presents for elevated serum creatinine per PCP    Olga came alone today.  No history of diabetes.  No history of hypertension.  No history of chronic kidney disease.  In the summer 2024 serum creatinine was found to be elevated 1.1, GFR 56-losartan 25 mg was added.  She does not check blood pressure at home.  Today blood pressure is excellent.  Urine dipstick with no albuminuria or hematuria.  Olga does not have significant comorbidities.  She admits she does not drink plenty of water-discussed appropriate hydration today.  No significant NSAID use at home    Family history: Dad with chronic kidney disease  Social history: Non-smoker, office disc  Surgical history: Irrelevant      Objective   There were no vitals taken for this visit.  Wt Readings from Last 3 Encounters:   11/04/24 61.1 kg (134 lb 12.8 oz)   07/18/24 61.2 kg (135 lb)   07/01/24 61.2 kg (135 lb)       Physical Exam    General appearance: no distress awake and alert on room air, euvolemic on exam  Eyes: non-icteric  HEENT: atrumatic head, PEERLA, moist mucosa  Skin: no apparent rash  Heart: NSR, S1, S2 normal, no murmur or gallop  Lungs: Symmetrical expansion,CTA bilat no wheezing/crackles  Abdomen: soft, nt/nd, obese  Extremities: no edema bilat  Neuro: No FND,asterixis, no focal deficits noticed        Review of Systems     Constitutional: no fever, no chills, no recent weight gain and no recent weight loss.   Eyes: no blurred vision and no diplopia.   ENT: no hearing loss, no earache, no sore throat, no swollen glands in the neck and no nasal discharge.   Cardiovascular: no chest pain, no palpitations and no lower extremity edema.   Respiratory: no shortness of breath, no chronic cough and no shortness of breath during exertion.   Gastrointestinal: no abdominal pain, no constipation, no heartburn, no vomiting, no bloody stools and no change  "in bowel movements.   Genitourinary: no dysuria and no hematuria.   Musculoskeletal: no arthralgias and no myalgias.   Skin: no rashes and no skin lesions.   Neurological: no headaches and no dizziness.   Psychiatric: no confusion, no depression and no anxiety.   Endocrine: no heat intolerance, no cold intolerance, appetite not increased, no thyroid disorder, no increased urinary frequency and no dry skin.   Hematologic/Lymphatic: does not bleed easily and does not bruise easily.   All other systems have been reviewed and are negative for complaint.         Data Review                   No results found for: \"URICACID\"        No results found for: \"HGBA1C\"              No lab exists for component: \"CR\", \"PHOSPHORUS\"        No results found for: \"MICROALBCREA\"         RFP  Recent Labs     07/18/24  1026 06/27/24  0921 04/27/23  0933 05/03/22  1533    139 141 142   K 4.7 4.4 4.8 4.8    104 104 106   CO2 27 28 27 29   BUN 23 27* 24* 22   CREATININE 1.11* 1.19* 1.04 1.01   GLUCOSE 75 81 83 83   CALCIUM 9.7 9.5 9.5 9.7   EGFR 56* 51*  --   --    ANIONGAP 14 11 15 12        Urineanalysis  Recent Labs     05/02/22  1753   COLORU YELLOW   APPEARANCEU CLEAR   SPECGRAVU 1.017   RUSSEL 7.0   PROTUR NEGATIVE   GLUCOSEU NEGATIVE   BLOODU NEGATIVE   KETONESU NEGATIVE   BILIRUBINU NEGATIVE   NITRITEU NEGATIVE   LEUKOCYTESU NEGATIVE       Urine Electrolytes  Recent Labs     05/02/22  1753   PROTUR NEGATIVE        Urine Micro  No results for input(s): \"WBCU\", \"RBCU\", \"HYALCASTU\", \"SQUAMEPIU\", \"BACTERIAU\", \"MUCUSU\" in the last 60699 hours.     Iron  No results for input(s): \"IRON\", \"TIBC\", \"IRONSAT\", \"FERRITIN\" in the last 69945 hours.       Current Outpatient Medications on File Prior to Visit   Medication Sig Dispense Refill    levothyroxine (Synthroid, Levoxyl) 25 mcg tablet TAKE ONE TABLET BY MOUTH DAILY 90 tablet 3    losartan (Cozaar) 25 mg tablet Take 1 tablet (25 mg) by mouth once daily. 90 tablet 1    multivitamin " with minerals (multivitamin-iron-folic acid) tablet Take by mouth.      psyllium husk (METAMUCIL ORAL) Take by mouth early in the morning..       No current facility-administered medications on file prior to visit.           Assessment and Plan       Olga Lane  is a 64 y.o. female who has past medical history of  hypothyroidism on supplements presents for elevated serum creatinine per PCP      # Elevated serum creatinine with no history of chronic kidney disease-possibly volume depletion  - Baseline SCr is normal less than 1, GFR above 60  - Most recent Scr 1.1, GFR 56 in July 2024  - Urine dipstick in office today is bland-   - Prior kidney US in July 2024 is unremarkable.  Left renal cyst-plan to follow-up per PCP  - Lyes : Within normal electrolytes  -Patient is on RAAS inhibitors.  Will hold for now-low risk for CKD progression, no albuminuria, no diabetes, this likely chronic kidney disease      # BP - today at office is normal with negative orthostatic vitals   -Instructed to start checking blood pressure at home  - Current meds: Losartan 25 mg  -I do not think she needs blood pressure medication at this time we will consider with follow-up      # No significant anemia    #(CKD)-MBD  -Will check calcium, phosphorus, PTH and vitamin D.  Currently she takes calcium/vitamin D multivitamin over-the-counter supplements    # [facility]  -Not high risk  -Currently not on statins    Repeat blood work in 2 weeks  Follow-up in 6 months with another repeat CKD lab    Patient received CKD education and counselling    Gali Malcolm MD, MS, ALOK DYE   Clinical  - [employer]   Nephrologist - [facility]

## 2025-01-24 ENCOUNTER — LAB (OUTPATIENT)
Dept: LAB | Facility: LAB | Age: 65
End: 2025-01-24
Payer: COMMERCIAL

## 2025-01-24 DIAGNOSIS — R79.89 ELEVATED SERUM CREATININE: ICD-10-CM

## 2025-01-24 DIAGNOSIS — Z00.00 ANNUAL PHYSICAL EXAM: ICD-10-CM

## 2025-01-24 DIAGNOSIS — N18.31 CKD STAGE 3A, GFR 45-59 ML/MIN (MULTI): ICD-10-CM

## 2025-01-24 LAB
ALBUMIN SERPL BCP-MCNC: 4 G/DL (ref 3.4–5)
ALP SERPL-CCNC: 61 U/L (ref 33–136)
ALT SERPL W P-5'-P-CCNC: 13 U/L (ref 7–45)
ANION GAP SERPL CALC-SCNC: 12 MMOL/L (ref 10–20)
APPEARANCE UR: CLEAR
AST SERPL W P-5'-P-CCNC: 20 U/L (ref 9–39)
BILIRUB SERPL-MCNC: 0.6 MG/DL (ref 0–1.2)
BILIRUB UR STRIP.AUTO-MCNC: NEGATIVE MG/DL
BUN SERPL-MCNC: 21 MG/DL (ref 6–23)
CALCIUM SERPL-MCNC: 9.2 MG/DL (ref 8.6–10.3)
CHLORIDE SERPL-SCNC: 105 MMOL/L (ref 98–107)
CHOLEST SERPL-MCNC: 190 MG/DL (ref 0–199)
CHOLESTEROL/HDL RATIO: 2.8
CO2 SERPL-SCNC: 27 MMOL/L (ref 21–32)
COLOR UR: ABNORMAL
CREAT SERPL-MCNC: 1.15 MG/DL (ref 0.5–1.05)
CREAT UR-MCNC: 160.6 MG/DL (ref 20–320)
EGFRCR SERPLBLD CKD-EPI 2021: 53 ML/MIN/1.73M*2
EST. AVERAGE GLUCOSE BLD GHB EST-MCNC: 108 MG/DL
GLUCOSE SERPL-MCNC: 85 MG/DL (ref 74–99)
GLUCOSE UR STRIP.AUTO-MCNC: NORMAL MG/DL
HBA1C MFR BLD: 5.4 %
HDLC SERPL-MCNC: 68.2 MG/DL
HOLD SPECIMEN: NORMAL
KETONES UR STRIP.AUTO-MCNC: NEGATIVE MG/DL
LDLC SERPL CALC-MCNC: 106 MG/DL
LEUKOCYTE ESTERASE UR QL STRIP.AUTO: NEGATIVE
MICROALBUMIN UR-MCNC: <7 MG/L
MICROALBUMIN/CREAT UR: NORMAL MG/G{CREAT}
MUCOUS THREADS #/AREA URNS AUTO: NORMAL /LPF
NITRITE UR QL STRIP.AUTO: NEGATIVE
NON HDL CHOLESTEROL: 122 MG/DL (ref 0–149)
PH UR STRIP.AUTO: 5 [PH]
PHOSPHATE SERPL-MCNC: 3.7 MG/DL (ref 2.5–4.9)
POTASSIUM SERPL-SCNC: 4.1 MMOL/L (ref 3.5–5.3)
PROT SERPL-MCNC: 6.9 G/DL (ref 6.4–8.2)
PROT UR STRIP.AUTO-MCNC: NEGATIVE MG/DL
RBC # UR STRIP.AUTO: ABNORMAL /UL
RBC #/AREA URNS AUTO: NORMAL /HPF
SODIUM SERPL-SCNC: 140 MMOL/L (ref 136–145)
SP GR UR STRIP.AUTO: 1.02
SQUAMOUS #/AREA URNS AUTO: NORMAL /HPF
TRIGL SERPL-MCNC: 79 MG/DL (ref 0–149)
TSH SERPL-ACNC: 3.72 MIU/L (ref 0.44–3.98)
UROBILINOGEN UR STRIP.AUTO-MCNC: NORMAL MG/DL
VLDL: 16 MG/DL (ref 0–40)
WBC #/AREA URNS AUTO: NORMAL /HPF

## 2025-01-24 PROCEDURE — 80053 COMPREHEN METABOLIC PANEL: CPT

## 2025-01-24 PROCEDURE — 84443 ASSAY THYROID STIM HORMONE: CPT

## 2025-01-24 PROCEDURE — 83036 HEMOGLOBIN GLYCOSYLATED A1C: CPT

## 2025-01-24 PROCEDURE — 80061 LIPID PANEL: CPT

## 2025-01-24 PROCEDURE — 81001 URINALYSIS AUTO W/SCOPE: CPT

## 2025-01-24 PROCEDURE — 82043 UR ALBUMIN QUANTITATIVE: CPT

## 2025-01-24 PROCEDURE — 84100 ASSAY OF PHOSPHORUS: CPT

## 2025-01-24 PROCEDURE — 82570 ASSAY OF URINE CREATININE: CPT

## 2025-02-06 ENCOUNTER — APPOINTMENT (OUTPATIENT)
Dept: PRIMARY CARE | Facility: CLINIC | Age: 65
End: 2025-02-06
Payer: COMMERCIAL

## 2025-02-06 ENCOUNTER — TELEPHONE (OUTPATIENT)
Dept: PRIMARY CARE | Facility: CLINIC | Age: 65
End: 2025-02-06

## 2025-02-06 VITALS
HEART RATE: 67 BPM | TEMPERATURE: 98 F | BODY MASS INDEX: 24.25 KG/M2 | HEIGHT: 62 IN | DIASTOLIC BLOOD PRESSURE: 84 MMHG | RESPIRATION RATE: 16 BRPM | SYSTOLIC BLOOD PRESSURE: 122 MMHG | WEIGHT: 131.8 LBS | OXYGEN SATURATION: 98 %

## 2025-02-06 DIAGNOSIS — M25.571 ACUTE RIGHT ANKLE PAIN: ICD-10-CM

## 2025-02-06 DIAGNOSIS — M25.551 RIGHT HIP PAIN: ICD-10-CM

## 2025-02-06 DIAGNOSIS — Z12.31 ENCOUNTER FOR SCREENING MAMMOGRAM FOR BREAST CANCER: ICD-10-CM

## 2025-02-06 DIAGNOSIS — N28.89 KIDNEY MASS: ICD-10-CM

## 2025-02-06 DIAGNOSIS — Z00.00 ANNUAL PHYSICAL EXAM: Primary | ICD-10-CM

## 2025-02-06 DIAGNOSIS — N18.31 CKD STAGE 3A, GFR 45-59 ML/MIN (MULTI): ICD-10-CM

## 2025-02-06 DIAGNOSIS — R31.29 MICROSCOPIC HEMATURIA: ICD-10-CM

## 2025-02-06 DIAGNOSIS — E03.9 HYPOTHYROIDISM, UNSPECIFIED TYPE: ICD-10-CM

## 2025-02-06 PROCEDURE — 1036F TOBACCO NON-USER: CPT | Performed by: FAMILY MEDICINE

## 2025-02-06 PROCEDURE — 99396 PREV VISIT EST AGE 40-64: CPT | Performed by: FAMILY MEDICINE

## 2025-02-06 PROCEDURE — 99214 OFFICE O/P EST MOD 30 MIN: CPT | Performed by: FAMILY MEDICINE

## 2025-02-06 PROCEDURE — 90715 TDAP VACCINE 7 YRS/> IM: CPT | Performed by: FAMILY MEDICINE

## 2025-02-06 PROCEDURE — 3008F BODY MASS INDEX DOCD: CPT | Performed by: FAMILY MEDICINE

## 2025-02-06 PROCEDURE — 90471 IMMUNIZATION ADMIN: CPT | Performed by: FAMILY MEDICINE

## 2025-02-06 ASSESSMENT — PATIENT HEALTH QUESTIONNAIRE - PHQ9
2. FEELING DOWN, DEPRESSED OR HOPELESS: NOT AT ALL
SUM OF ALL RESPONSES TO PHQ9 QUESTIONS 1 AND 2: 0
1. LITTLE INTEREST OR PLEASURE IN DOING THINGS: NOT AT ALL

## 2025-02-06 NOTE — PROGRESS NOTES
"Subjective   Olga Lane is a 64 y.o. female who presents for Annual Exam.  HPI  PMH:  NIL neg HPV 5/2022  Nml c-scope 6/2017 rpt 10 yr   RSV/Shingrix UTD per pt not in chart   Mild CKD-neprho 7/2024   Hypothyroidism  Renal cyst 7/2024 rpt 6 mo     Here for CPE  Mammo UTD due in July.   R shoulder pain better w home PT program   Seeing nephro for mild CKD  Renal cyst found in July planned on rpt US in 6 mo.   Micro blood on UA in dec and jan   Foot pain resolved.     R upper thigh pain on and off    R ankle pain while walking 3 wk ago and twist ankle.   Current Outpatient Medications on File Prior to Visit   Medication Sig Dispense Refill    levothyroxine (Synthroid, Levoxyl) 25 mcg tablet TAKE ONE TABLET BY MOUTH DAILY 90 tablet 3    multivitamin with minerals (multivitamin-iron-folic acid) tablet Take by mouth.      psyllium husk (METAMUCIL ORAL) Take by mouth early in the morning..      losartan (Cozaar) 25 mg tablet Take 1 tablet (25 mg) by mouth once daily. 90 tablet 1     No current facility-administered medications on file prior to visit.                  Objective   /84   Pulse 67   Temp 36.7 °C (98 °F)   Resp 16   Ht 1.575 m (5' 2\")   Wt 59.8 kg (131 lb 12.8 oz)   SpO2 98%   BMI 24.11 kg/m²    Physical Exam  General: NAD  HEENT:NCAT, PERRLA, nml OP, b/l TM clear   Neck: no cervical BHARATHI  Heart: RRR no murmur, no edema   Lungs: CTA b/l, no wheeze or rhonchi   GI: abd soft, nontender, nondistended.   MSK: no c/c/e. nml gait   R lat malleolus ankle edema, nml strength/ROM/wt bearing  Skin: warm and dry  Psych: cooperative, appropriate affect  Neuro: speech clear. A&Ox3  Assessment/Plan   Problem List Items Addressed This Visit       Hypothyroidism  TSH nml   Cont LT4 25        Other Visit Diagnoses       Annual physical exam    -  Primary  CPE:overall doing well. Cont balanced diet/reg ex   BMI: 24  VACC: reviewed consider PNA, shingrix/RSV UTD, tdap today consider flu/covid  COLON CA SCRN: " UTD  LABS: reviewed w pt at goal besides aforementioned   PAP: UTD  MAMMO: UTD ordered for 2025  DEXA: 65      CKD stage 3a, GFR 45-59 ml/min (Multi)      Stable  UTD nephro   No nephrotox   Off losartan       Microscopic hematuria      Micro hematuira x 2 in the setting of renal cyst  D/w uro Dr Hyde  RF to uro placed   Will get MRI w and without   Will needs cyst too     Relevant Orders    Referral to Urology    Encounter for screening mammogram for breast cancer        Relevant Orders    BI mammo bilateral screening tomosynthesis    Right hip pain      OA vs MSK   Since intermittent will hold off on imaging/PT       Acute right ankle pain      D/t ankle sprain   Improving  Cont w ice for swelling  Rec home ex.

## 2025-02-06 NOTE — TELEPHONE ENCOUNTER
Pt mentioned on her way out of the office after her appointment that she wanted to know BB's thoughts and medical advice regarding an inversion table she purchased approximately a month ago.    Frequency? Duration? Angle? Pros and cons?    advise

## 2025-02-06 NOTE — TELEPHONE ENCOUNTER
That is not something I routinely recommend so I am unable to give her specifics. A lot of chiros use them and could guide her better

## 2025-03-01 ENCOUNTER — HOSPITAL ENCOUNTER (OUTPATIENT)
Dept: RADIOLOGY | Facility: HOSPITAL | Age: 65
Discharge: HOME | End: 2025-03-01
Payer: COMMERCIAL

## 2025-03-01 DIAGNOSIS — R31.29 MICROSCOPIC HEMATURIA: ICD-10-CM

## 2025-03-01 DIAGNOSIS — N28.89 KIDNEY MASS: ICD-10-CM

## 2025-03-01 PROCEDURE — 2550000001 HC RX 255 CONTRASTS: Performed by: FAMILY MEDICINE

## 2025-03-01 PROCEDURE — A9575 INJ GADOTERATE MEGLUMI 0.1ML: HCPCS | Performed by: FAMILY MEDICINE

## 2025-03-01 PROCEDURE — 74183 MRI ABD W/O CNTR FLWD CNTR: CPT

## 2025-03-01 PROCEDURE — 74183 MRI ABD W/O CNTR FLWD CNTR: CPT | Performed by: STUDENT IN AN ORGANIZED HEALTH CARE EDUCATION/TRAINING PROGRAM

## 2025-03-01 RX ORDER — GADOTERATE MEGLUMINE 376.9 MG/ML
12 INJECTION INTRAVENOUS
Status: COMPLETED | OUTPATIENT
Start: 2025-03-01 | End: 2025-03-01

## 2025-03-01 RX ADMIN — GADOTERATE MEGLUMINE 12 ML: 376.9 INJECTION INTRAVENOUS at 11:25

## 2025-03-04 ENCOUNTER — TELEPHONE (OUTPATIENT)
Dept: PRIMARY CARE | Facility: CLINIC | Age: 65
End: 2025-03-04
Payer: COMMERCIAL

## 2025-03-04 NOTE — TELEPHONE ENCOUNTER
Called pt   Renal cyst rec rpt in 6-12 mo  Will f/up uro next mo as salvatore     GB adenomyosis   No RUQ pain  +bloating  No indication for surg eval     Lumbar DDD  Mild pain but manageable

## 2025-03-13 ENCOUNTER — APPOINTMENT (OUTPATIENT)
Dept: UROLOGY | Facility: HOSPITAL | Age: 65
End: 2025-03-13
Payer: COMMERCIAL

## 2025-04-21 ENCOUNTER — APPOINTMENT (OUTPATIENT)
Dept: UROLOGY | Facility: CLINIC | Age: 65
End: 2025-04-21
Payer: COMMERCIAL

## 2025-04-21 VITALS — WEIGHT: 131.6 LBS | TEMPERATURE: 96.9 F | HEIGHT: 62 IN | BODY MASS INDEX: 24.22 KG/M2

## 2025-04-21 DIAGNOSIS — R31.29 MICROSCOPIC HEMATURIA: ICD-10-CM

## 2025-04-21 DIAGNOSIS — N28.1 RENAL CYST: Primary | ICD-10-CM

## 2025-04-21 PROCEDURE — 3008F BODY MASS INDEX DOCD: CPT | Performed by: STUDENT IN AN ORGANIZED HEALTH CARE EDUCATION/TRAINING PROGRAM

## 2025-04-21 PROCEDURE — 99204 OFFICE O/P NEW MOD 45 MIN: CPT | Performed by: STUDENT IN AN ORGANIZED HEALTH CARE EDUCATION/TRAINING PROGRAM

## 2025-04-21 ASSESSMENT — PAIN SCALES - GENERAL: PAINLEVEL_OUTOF10: 0-NO PAIN

## 2025-04-21 NOTE — PATIENT INSTRUCTIONS
Please call Radiology at [phone] to schedule imaging     Please go to nearest outpatient  lab to get labs obtained prior to your next visit    Follow up visit in 1 year

## 2025-04-21 NOTE — PROGRESS NOTES
Subjective    Olga Lane is a 64 y.o. female with a renal cyst who presents for NPV.    The patient had imaging done 2025 renal. 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   This was found incidentally.  I reviewed the imaging.  It is a very central endophytic cyst without concerning features.    She does have microscopic hematuria that just meets criteria including 3 RBC/HPF    PMHx: hypothyroidism       PSHx:  has a past surgical history that includes Other surgical history (2022).      Social:   Tobacco Use: Low Risk  (2025)    Patient History     Smoking Tobacco Use: Never     Smokeless Tobacco Use: Never     Passive Exposure: Not on file         Family: Mother who  of bladder cancer.          Review of Systems    All systems were reviewed. Anything negative was noted in the HPI.    Objective   Physical Exam  Gen: No acute distress      Psych: Alert and oriented x3      Neuro:  Normal ROM     Resp: Nonlabored respirations      CV: Regular rate and rhythm      Abd: S, NT, ND.     : Deferred     Skin: Warm, dry and intact without rashes      Lymphatics: No peripheral edema           Medical History[1]      Surgical History[2]      Assessment & Plan  Microscopic hematuria    Orders:    Referral to Urology    Renal cyst  Patient was educated on pathology of 2F Bosniak cyst.   This has a single digit very low risk of malignancy.  It does not need forgotten, it just needs to be watched over time.  At this time surveillance is best course of action with MRI renal in 12 months.    We went over microhematuria guidelines.  We went over the role of cystoscopy.  We went over most recent guideline updates.  We offered cystoscopy now in the understanding that this will likely be negative OR repetaing UA with microscopy in 1 year and if microhematuria then still, we will do it then.    Plan:  MRI renal in 12 months  Microscopic urine in 12 months  FUV 1 year            Scribe Attestation  By signing my name below, I, Jada Howard   attest that this documentation has been prepared under the direction and in the presence of Rocky Hyde MD MPH         [1] No past medical history on file.  [2]   Past Surgical History:  Procedure Laterality Date    OTHER SURGICAL HISTORY  05/02/2022    Corneal lasik

## 2025-04-21 NOTE — ASSESSMENT & PLAN NOTE
Patient was educated on pathology of 2F Bosniak cyst.   This has a single digit very low risk of malignancy.  It does not need forgotten, it just needs to be watched over time.  At this time surveillance is best course of action with MRI renal in 12 months.    We went over microhematuria guidelines.  We went over the role of cystoscopy.  We went over most recent guideline updates.  We offered cystoscopy now in the understanding that this will likely be negative OR repetaing UA with microscopy in 1 year and if microhematuria then still, we will do it then.    Plan:  MRI renal in 12 months  Microscopic urine in 12 months  FUV 1 year

## 2025-05-30 DIAGNOSIS — R79.89 ELEVATED SERUM CREATININE: ICD-10-CM

## 2025-05-31 LAB
25(OH)D3+25(OH)D2 SERPL-MCNC: 31 NG/ML (ref 30–100)
ALBUMIN SERPL-MCNC: 4.6 G/DL (ref 3.6–5.1)
ALBUMIN/CREAT UR: NORMAL
BUN SERPL-MCNC: 22 MG/DL (ref 7–25)
BUN/CREAT SERPL: NORMAL (CALC) (ref 6–22)
CALCIUM SERPL-MCNC: 9.5 MG/DL (ref 8.6–10.4)
CHLORIDE SERPL-SCNC: 105 MMOL/L (ref 98–110)
CO2 SERPL-SCNC: 27 MMOL/L (ref 20–32)
CREAT SERPL-MCNC: 0.95 MG/DL (ref 0.5–1.05)
CREAT UR-MCNC: NORMAL MG/DL
EGFRCR SERPLBLD CKD-EPI 2021: 67 ML/MIN/1.73M2
ERYTHROCYTE [DISTWIDTH] IN BLOOD BY AUTOMATED COUNT: 13.6 % (ref 11–15)
FERRITIN SERPL-MCNC: 72 NG/ML (ref 16–288)
GLUCOSE SERPL-MCNC: 73 MG/DL (ref 65–99)
HCT VFR BLD AUTO: 41.4 % (ref 35–45)
HGB BLD-MCNC: 13.4 G/DL (ref 11.7–15.5)
IRON SATN MFR SERPL: 32 % (CALC) (ref 16–45)
IRON SERPL-MCNC: 109 MCG/DL (ref 45–160)
MCH RBC QN AUTO: 31 PG (ref 27–33)
MCHC RBC AUTO-ENTMCNC: 32.4 G/DL (ref 32–36)
MCV RBC AUTO: 95.8 FL (ref 80–100)
MICROALBUMIN UR-MCNC: NORMAL
PHOSPHATE SERPL-MCNC: 4.1 MG/DL (ref 2.5–4.5)
PLATELET # BLD AUTO: 281 THOUSAND/UL (ref 140–400)
PMV BLD REES-ECKER: 11.2 FL (ref 7.5–12.5)
POTASSIUM SERPL-SCNC: 4.5 MMOL/L (ref 3.5–5.3)
PTH-INTACT SERPL-MCNC: 40 PG/ML (ref 16–77)
RBC # BLD AUTO: 4.32 MILLION/UL (ref 3.8–5.1)
SODIUM SERPL-SCNC: 140 MMOL/L (ref 135–146)
TIBC SERPL-MCNC: 339 MCG/DL (CALC) (ref 250–450)
WBC # BLD AUTO: 7 THOUSAND/UL (ref 3.8–10.8)

## 2025-06-02 ENCOUNTER — APPOINTMENT (OUTPATIENT)
Dept: NEPHROLOGY | Facility: CLINIC | Age: 65
End: 2025-06-02
Payer: COMMERCIAL

## 2025-06-02 VITALS
TEMPERATURE: 97.3 F | DIASTOLIC BLOOD PRESSURE: 81 MMHG | HEIGHT: 62 IN | HEART RATE: 63 BPM | BODY MASS INDEX: 24.77 KG/M2 | WEIGHT: 134.6 LBS | SYSTOLIC BLOOD PRESSURE: 117 MMHG | OXYGEN SATURATION: 99 %

## 2025-06-02 DIAGNOSIS — N28.1 RENAL CYST: Primary | ICD-10-CM

## 2025-06-02 DIAGNOSIS — R79.89 ELEVATED SERUM CREATININE: ICD-10-CM

## 2025-06-02 LAB
25(OH)D3+25(OH)D2 SERPL-MCNC: 31 NG/ML (ref 30–100)
ALBUMIN SERPL-MCNC: 4.6 G/DL (ref 3.6–5.1)
ALBUMIN/CREAT UR: NORMAL MG/G CREAT
BUN SERPL-MCNC: 22 MG/DL (ref 7–25)
BUN/CREAT SERPL: NORMAL (CALC) (ref 6–22)
CALCIUM SERPL-MCNC: 9.5 MG/DL (ref 8.6–10.4)
CHLORIDE SERPL-SCNC: 105 MMOL/L (ref 98–110)
CO2 SERPL-SCNC: 27 MMOL/L (ref 20–32)
CREAT SERPL-MCNC: 0.95 MG/DL (ref 0.5–1.05)
CREAT UR-MCNC: 22 MG/DL (ref 20–275)
EGFRCR SERPLBLD CKD-EPI 2021: 67 ML/MIN/1.73M2
ERYTHROCYTE [DISTWIDTH] IN BLOOD BY AUTOMATED COUNT: 13.6 % (ref 11–15)
FERRITIN SERPL-MCNC: 72 NG/ML (ref 16–288)
GLUCOSE SERPL-MCNC: 73 MG/DL (ref 65–99)
HCT VFR BLD AUTO: 41.4 % (ref 35–45)
HGB BLD-MCNC: 13.4 G/DL (ref 11.7–15.5)
IRON SATN MFR SERPL: 32 % (CALC) (ref 16–45)
IRON SERPL-MCNC: 109 MCG/DL (ref 45–160)
MCH RBC QN AUTO: 31 PG (ref 27–33)
MCHC RBC AUTO-ENTMCNC: 32.4 G/DL (ref 32–36)
MCV RBC AUTO: 95.8 FL (ref 80–100)
MICROALBUMIN UR-MCNC: <0.2 MG/DL
PHOSPHATE SERPL-MCNC: 4.1 MG/DL (ref 2.5–4.5)
PLATELET # BLD AUTO: 281 THOUSAND/UL (ref 140–400)
PMV BLD REES-ECKER: 11.2 FL (ref 7.5–12.5)
POTASSIUM SERPL-SCNC: 4.5 MMOL/L (ref 3.5–5.3)
PTH-INTACT SERPL-MCNC: 40 PG/ML (ref 16–77)
RBC # BLD AUTO: 4.32 MILLION/UL (ref 3.8–5.1)
SODIUM SERPL-SCNC: 140 MMOL/L (ref 135–146)
TIBC SERPL-MCNC: 339 MCG/DL (CALC) (ref 250–450)
WBC # BLD AUTO: 7 THOUSAND/UL (ref 3.8–10.8)

## 2025-06-02 PROCEDURE — 3008F BODY MASS INDEX DOCD: CPT | Performed by: INTERNAL MEDICINE

## 2025-06-02 PROCEDURE — 99213 OFFICE O/P EST LOW 20 MIN: CPT | Performed by: INTERNAL MEDICINE

## 2025-06-02 NOTE — PATIENT INSTRUCTIONS
Dear Olga-it was nice meeting you in the nephrology clinic today discuss the following    # Mild drop in kidney function 56%.  Improved after we held losartan-no need to resume.        # Blood pressure is excellent.  Currently not on blood pressure medications    # Left kidney [id number] with urology      Follow-up as needed    Gali Malcolm MD, MS, ALOK DYE   Clinical  - [employer] [employer]   Nephrologist - [facility]

## 2025-06-02 NOTE — PROGRESS NOTES
"Subjective       Olga Lane is a 64 y.o. female who has past medical history of hypothyroidism on supplements presents for elevated serum creatinine follow-up    Last office visit December 2024.  We held losartan in the light of low blood pressure and worsening kidney function.  Today, no kidney related complaints or concerns.  Blood pressures in target-currently not on blood pressure medication.  Kidney function improved and creatinine less than 1 and GFR above 60.  With normal electrolytes.  No anemia.  No albuminuria was negative spot test ACR.  Normal vitamin D.  Overall she stable from kidney standpoint.  Follow-up with nephrology as needed    Prior notes    Olga came alone today.  No history of diabetes.  No history of hypertension.  No history of chronic kidney disease.  In the summer 2024 serum creatinine was found to be elevated 1.1, GFR 56-losartan 25 mg was added.  She does not check blood pressure at home.  Today blood pressure is excellent.  Urine dipstick with no albuminuria or hematuria.  Olga does not have significant comorbidities.  She admits she does not drink plenty of water-discussed appropriate hydration today.  No significant NSAID use at home    Family history: Dad with chronic kidney disease  Social history: Non-smoker, office disc  Surgical history: Irrelevant      Objective   /81 (BP Location: Left arm, Patient Position: Standing, BP Cuff Size: Adult)   Pulse 63   Temp 36.3 °C (97.3 °F)   Ht 1.575 m (5' 2\")   Wt 61.1 kg (134 lb 9.6 oz)   SpO2 99%   BMI 24.62 kg/m²   Wt Readings from Last 3 Encounters:   06/02/25 61.1 kg (134 lb 9.6 oz)   04/21/25 59.7 kg (131 lb 9.6 oz)   02/06/25 59.8 kg (131 lb 12.8 oz)       Physical Exam    General appearance: no distress awake and alert on room air, euvolemic on exam  Eyes: non-icteric  HEENT: atrumatic head, PEERLA, moist mucosa  Skin: no apparent rash  Heart: NSR, S1, S2 normal, no murmur or gallop  Lungs: Symmetrical expansion,CTA bilat " "no wheezing/crackles  Abdomen: soft, nt/nd, obese  Extremities: no edema bilat  Neuro: No FND,asterixis, no focal deficits noticed        Review of Systems     Constitutional: no fever, no chills, no recent weight gain and no recent weight loss.   Eyes: no blurred vision and no diplopia.   ENT: no hearing loss, no earache, no sore throat, no swollen glands in the neck and no nasal discharge.   Cardiovascular: no chest pain, no palpitations and no lower extremity edema.   Respiratory: no shortness of breath, no chronic cough and no shortness of breath during exertion.   Gastrointestinal: no abdominal pain, no constipation, no heartburn, no vomiting, no bloody stools and no change in bowel movements.   Genitourinary: no dysuria and no hematuria.   Musculoskeletal: no arthralgias and no myalgias.   Skin: no rashes and no skin lesions.   Neurological: no headaches and no dizziness.   Psychiatric: no confusion, no depression and no anxiety.   Endocrine: no heat intolerance, no cold intolerance, appetite not increased, no thyroid disorder, no increased urinary frequency and no dry skin.   Hematologic/Lymphatic: does not bleed easily and does not bruise easily.   All other systems have been reviewed and are negative for complaint.         Data Review        Results from last 7 days   Lab Units 05/30/25  1231   [facility] WBC AUTO Thousand/uL 7.0   [facility] HEMOGLOBIN g/dL 13.4   [facility] HEMATOCRIT % 41.4   [facility] PLATELETS AUTO Thousand/uL 281            No results found for: \"URICACID\"        Lab Results   Component Value Date    HGBA1C 5.4 01/24/2025           Results from last 7 days   Lab Units 05/30/25  1231   [facility] SODIUM mmol/L 140   [facility] POTASSIUM mmol/L 4.5   [facility] CHLORIDE mmol/L 105   [facility] CO2 mmol/L 27   [facility] BUN mg/dL 22   [facility] GLUCOSE mg/dL 73   [facility] CALCIUM mg/dL 9.5   [facility] EGFR mL/min/1.73m2 67           Albumin/Creatinine Ratio   Date Value Ref Range Status   01/24/2025   Final     Comment:     One or more " analytes used in this calculation is outside of the analytical measurement range. Calculation cannot be performed.            RFP  Recent Labs     05/30/25  1231 01/24/25  0910 07/18/24  1026 06/27/24  0921 04/27/23  0933 05/03/22  1533    140 141 139 141 142   K 4.5 4.1 4.7 4.4 4.8 4.8    105 105 104 104 106   CO2 27 27 27 28 27 29   BUN 22 21 23 27* 24* 22   CREATININE 0.95 1.15* 1.11* 1.19* 1.04 1.01   GLUCOSE 73 85 75 81 83 83   CALCIUM 9.5 9.2 9.7 9.5 9.5 9.7   PHOS 4.1 3.7  --   --   --   --    EGFR 67 53* 56* 51*  --   --    ANIONGAP  --  12 14 11 15 12        Urineanalysis  Recent Labs     01/24/25  1014 12/03/24  1119 05/02/22  1753   COLORU Light-Yellow Yellow YELLOW   APPEARANCEU Clear Hazy* CLEAR   SPECGRAVU 1.022 <=1.005 1.017   RUSSEL 5.0 5.0 7.0   PROTUR NEGATIVE NEGATIVE NEGATIVE   GLUCOSEU Normal NEGATIVE NEGATIVE   BLOODU 0.1 (1+)* SMALL (1+)* NEGATIVE   KETONESU NEGATIVE NEGATIVE NEGATIVE   BILIRUBINU NEGATIVE NEGATIVE NEGATIVE   NITRITEU NEGATIVE NEGATIVE NEGATIVE   LEUKOCYTESU NEGATIVE  --  NEGATIVE       Urine Electrolytes  Recent Labs     01/24/25  1014 12/03/24  1119 05/02/22  1753   CREATU 160.6  --   --    PROTUR NEGATIVE NEGATIVE NEGATIVE   ALBUMINUR <7.0  --   --         Urine Micro  Recent Labs     01/24/25  1014   WBCU 1-5   RBCU 3-5   SQUAMEPIU 1-9 (SPARSE)   MUCUSU FEW        Iron  Recent Labs     05/30/25  1231   IRON 109   TIBC 339   IRONSAT 32   FERRITIN 72          Current Outpatient Medications on File Prior to Visit   Medication Sig Dispense Refill    levothyroxine (Synthroid, Levoxyl) 25 mcg tablet TAKE ONE TABLET BY MOUTH DAILY 90 tablet 3    multivitamin with minerals (multivitamin-iron-folic acid) tablet Take by mouth.      psyllium husk (METAMUCIL ORAL) Take by mouth early in the morning..      [DISCONTINUED] losartan (Cozaar) 25 mg tablet Take 1 tablet (25 mg) by mouth once daily. 90 tablet 1     No current facility-administered medications on file prior to visit.            Assessment and Plan       Olga Lane  is a 64 y.o. female who has past medical history of  hypothyroidism on supplements presents for elevated serum creatinine per PCP      # Elevated serum creatinine with no history of chronic kidney disease-possibly volume depletion and unnecessary RAAS inhibitors usage  - Kidney function improved after holding RAAS inhibitor with serum creatinine less than 1 and GFR of 60  - Urine dipstick in office today is bland.  Negative spot test ACR  - Prior kidney US in July 2024 is unremarkable.  Left renal cyst-she follows with urology  - Lyes : Within normal electrolytes  - No indication to resume RAAS inhibitors at this time      # BP - today at office is normal with negative orthostatic vitals   - Not currently on medication  -I do not think she needs blood pressure medication at this time       # No significant anemia    #(CKD)-MBD  - With normal calcium, phosphorus, vitamin D, PTH    # CVS  -Not high risk  -Currently not on statins    Follow-up as needed    Patient received CKD education and counselling    Gali Malcolm MD, MS, ALOK DYE   Clinical  - [facility] [employer]   Nephrologist - [facility]

## 2025-06-23 ENCOUNTER — APPOINTMENT (OUTPATIENT)
Dept: NEPHROLOGY | Facility: CLINIC | Age: 65
End: 2025-06-23
Payer: COMMERCIAL

## 2025-06-24 ENCOUNTER — APPOINTMENT (OUTPATIENT)
Dept: NEPHROLOGY | Facility: CLINIC | Age: 65
End: 2025-06-24
Payer: COMMERCIAL

## 2025-07-21 ENCOUNTER — HOSPITAL ENCOUNTER (OUTPATIENT)
Dept: RADIOLOGY | Facility: HOSPITAL | Age: 65
Discharge: HOME | End: 2025-07-21
Payer: COMMERCIAL

## 2025-07-21 VITALS — HEIGHT: 62 IN | WEIGHT: 134 LBS | BODY MASS INDEX: 24.66 KG/M2

## 2025-07-21 DIAGNOSIS — Z12.31 ENCOUNTER FOR SCREENING MAMMOGRAM FOR BREAST CANCER: ICD-10-CM

## 2025-07-21 PROCEDURE — 77067 SCR MAMMO BI INCL CAD: CPT

## 2025-07-21 PROCEDURE — 77063 BREAST TOMOSYNTHESIS BI: CPT | Performed by: RADIOLOGY

## 2025-07-21 PROCEDURE — 77067 SCR MAMMO BI INCL CAD: CPT | Performed by: RADIOLOGY

## 2025-09-03 DIAGNOSIS — E03.9 HYPOTHYROIDISM, UNSPECIFIED TYPE: ICD-10-CM

## 2025-09-03 RX ORDER — LEVOTHYROXINE SODIUM 25 UG/1
25 TABLET ORAL DAILY
Qty: 90 TABLET | Refills: 3 | Status: SHIPPED | OUTPATIENT
Start: 2025-09-03